# Patient Record
Sex: MALE | Race: WHITE
[De-identification: names, ages, dates, MRNs, and addresses within clinical notes are randomized per-mention and may not be internally consistent; named-entity substitution may affect disease eponyms.]

---

## 2018-06-18 LAB
AVERAGE GLUCOSE: NORMAL
CHOLESTEROL, TOTAL: 154 MG/DL
CHOLESTEROL/HDL RATIO: 3.3
HBA1C MFR BLD: 5.6 %
HDLC SERPL-MCNC: 47 MG/DL (ref 35–70)
LDL CHOLESTEROL CALCULATED: 94 MG/DL (ref 0–160)
TRIGL SERPL-MCNC: 50 MG/DL
VLDLC SERPL CALC-MCNC: NORMAL MG/DL

## 2018-06-27 NOTE — PATIENT DISCUSSION
Avoid strenuous activity after surgery. Protect eye at all times with shield or glasses for 2 weeks after surgery.

## 2018-06-27 NOTE — PATIENT DISCUSSION
Recommended Repair of Retinal Detachment surgery OS. PPV (25G), EL, AFGe 18% C3F8. Explained the process of the surgery to the patient today. Explained MAC and Block anesthetics to the patient as well as that the patient will take about 75 minutes and we will perform it tomorrow. We will perform the H&P today in clinic and obtain [facility] for the hypertension today. The patient states they are not on any blood thinners at this time and have not had any major heart/lung conditions that would need extensive MC. The patient does not need transportation. PO drops were placed into pharmacy at today's visit. We will follow up with the patient for a 1 day PO at 7:45 this Friday. We handed out a paper on pre-/post-operative information to the patient as well as brochures on Retinal Detachments, Vitrectomy Surgeries and [employer] chair if the patient feels they need it.

## 2018-06-27 NOTE — PATIENT DISCUSSION
Retinal detachment noted on today's examination and diagnostic testing. Recommended surgery to the patient.

## 2018-06-28 NOTE — PATIENT DISCUSSION
Recommended Repair of Retinal Detachment surgery OS. PPV (25G), EL, AFGe 18% C3F8. Explained the process of the surgery to the patient today. Explained MAC and Block anesthetics to the patient as well as that the patient will take about 75 minutes and we will perform it tomorrow. We will perform the H&P today in clinic and obtain [facility] for the hypertension today. The patient states they are not on any blood thinners at this time and have not had any major heart/lung conditions that would need extensive MC. The patient does not need transportation. PO drops were placed into pharmacy at today's visit. We will follow up with the patient for a 1 day PO at 7:45 this Friday. We handed out a paper on pre-/post-operative information to the patient as well as brochures on Retinal Detachments, Vitrectomy Surgeries and [employer] chair if the patient feels they need it.

## 2018-06-28 NOTE — PATIENT DISCUSSION
Explained post-operative information to the patient. The patient will have a red and sore eye for about a week. This is completely normal. If the pain becomes severe, we ask the patient to come back in immediately, 24/7 ER discussed. We provided post-operative drops with instructions and an eye patch for the patient to wear at night. The patient will begin Moxifloxacin four times a day and Pred Forte every two hours. The patient will need to be in head down positioning for 55 minutes out of every hour for the next week. The presence of an intraocular gas bubble means the patient will not be able to fly until the bubble has dissipated and the patient has been cleared. This will take about 2-2.5 months. We ask that the patient avoid excess water in the eye when showering and try to avoid swimming. This will help reduce possibility of infection. We ask that the patient wear the eye patch we give them at night while sleeping to avoid abrasions and excess rubbing of the eye while sleeping. We ask the patient to sleep on their side or stomach while in bed so the gas bubble is positioned correctly. We also ask that the patient the patient not participate in strenuous activity for the next few weeks and not lift more than 20 lbs for another week or two.

## 2018-06-29 NOTE — PATIENT DISCUSSION
Explained post-operative information to the patient. The patient will have a red and sore eye for about a week. This is completely normal. If the pain becomes severe, we ask the patient to come back in immediately, 24/7 ER discussed. We provided post-operative drops with instructions and an eye patch for the patient to wear at night. The patient will begin Moxifloxacin four times a day, Atropine once a night, Medrol DosePak as directed and Durezol four times a day. The patient will need to be in head down positioning for 55 minutes out of every hour for the next week. The presence of silicone oil has been explained to patient will require additional surgery in future, we need to give the eye time to heal and then we will discuss surgery at that time. We ask that the patient avoid excess water in the eye when showering and try to avoid swimming. This will help reduce possibility of infection. We ask that the patient wear the eye patch we give them at night while sleeping to avoid abrasions and excess rubbing of the eye while sleeping. We ask the patient to sleep on their side or stomach while in bed so the gas bubble is positioned correctly. We also ask that the patient not participate in strenuous activity for the next few weeks and not lift more than 20 lbs for another week or two.

## 2018-07-05 NOTE — PATIENT DISCUSSION
Explained post-operative information to the patient. The patient will have a red and sore eye for about a week. This is completely normal. If the pain becomes severe, we ask the patient to come back in immediately, 24/7 ER discussed. We provided post-operative drops with instructions and an eye patch for the patient to wear at night. The patient will begin D/C Moxi, Atropine once a night, Medrol DosePak as directed and Durezol four times a day. The patient will need to be in head down positioning for 55 minutes out of every hour for the next week. The presence of silicone oil has been explained to patient will require additional surgery in future, we need to give the eye time to heal and then we will discuss surgery at that time. We ask that the patient avoid excess water in the eye when showering and try to avoid swimming. This will help reduce possibility of infection. We ask that the patient wear the eye patch we give them at night while sleeping to avoid abrasions and excess rubbing of the eye while sleeping. We ask the patient to sleep on their side or stomach while in bed so the gas bubble is positioned correctly. We also ask that the patient not participate in strenuous activity for the next few weeks and not lift more than 20 lbs for another week or two.

## 2018-07-13 NOTE — PATIENT DISCUSSION
Explained post-operative information to the patient. If the pain becomes severe, we ask the patient to come back in immediately, 24/7 ER discussed. We provided post-operative drops with instructions. The patient to continue Cyclo bid and taper Durezol to tid . The presence of silicone oil has been explained to patient will require additional surgery in future, we need to give the eye time to heal and then we will discuss surgery at that time. We ask that the patient avoid excess water in the eye when showering and try to avoid swimming. This will help reduce possibility of infection. We ask that the patient wear the eye patch we give them at night while sleeping to avoid abrasions and excess rubbing of the eye while sleeping. We also ask that the patient not participate in strenuous activity for the next few weeks and not lift more than 20 lbs for another week or two.

## 2018-08-03 NOTE — PATIENT DISCUSSION
24/7 ER discussed. We provided post-operative drops with instructions. The patient to discontinue Cyclo and taper Durezol to bid until it runs out then we ask the patient to use Pred QID. The presence of silicone oil has been explained to patient will require additional surgery in future, we need to give the eye time to heal and then we will discuss surgery at that time.  We also ask that the patient not participate in strenuous activity for the next few weeks and not lift more than 20 lbs for another week or two.

## 2018-08-10 NOTE — PATIENT DISCUSSION
Recommended vitrectomy and scleral buckle for surgical repair.  New detachment noted today.  Inferotemporal PVR w/two breaks.  Recommend patient be seen by Dr. Juan Villeda for surgery.  Patient will follow up with our office for post op appointments.

## 2018-08-14 NOTE — PATIENT DISCUSSION
Explained post-operative information to the patient. The patient will have a red and sore eye for about a week. This is completely normal. If the pain becomes severe, we ask the patient to come back in immediately, 24/7 ER discussed. We provided post-operative drops with instructions and an eye patch for the patient to wear at night. The patient will begin Moxifloxacin four times a day and Pred Forte four times a day once Durezol runs out. The patient will need to be in head down positioning for 55 minutes out of every hour for the next week. The presence of an intraocular gas bubble means the patient will not be able to fly until the bubble has dissipated and the patient has been cleared. This will take about 2-2.5 months. We ask that the patient avoid excess water in the eye when showering and try to avoid swimming. This will help reduce possibility of infection. We ask that the patient wear the eye patch we give them at night while sleeping to avoid abrasions and excess rubbing of the eye while sleeping. We ask the patient to sleep on their side or stomach while in bed so the gas bubble is positioned correctly. We also ask that the patient the patient not participate in strenuous activity for the next few weeks and not lift more than 20 lbs for another week or two.

## 2018-08-17 NOTE — PATIENT DISCUSSION
Explained post-operative information to the patient. The patient will have a red and sore eye for about a week. This is completely normal. If the pain becomes severe, we ask the patient to come back in immediately, 24/7 ER discussed. We provided post-operative drops with instructions and an eye patch for the patient to wear at night. The patient will use  Moxifloxacin four times a day until [facility] then stop. Patient to use Pred Forte four times a day. The patient will need to be in head down positioning for 55 minutes out of every hour until Monday 8/20/18 then patient is to sleep on his side. The presence of an intraocular gas bubble means the patient will not be able to fly until the bubble has dissipated and the patient has been cleared. This will take about 2-2.5 months. We ask that the patient avoid excess water in the eye when showering and try to avoid swimming. This will help reduce possibility of infection. We ask that the patient wear the eye patch we give them at night while sleeping to avoid abrasions and excess rubbing of the eye while sleeping. We also ask that the patient the patient not participate in strenuous activity for the next few weeks and not lift more than 20 lbs for another week or two.

## 2019-01-09 NOTE — PATIENT DISCUSSION
Doing well, retina attached, Macula flat, no signs of endophthalmitis. Patient is ready for Oil removal, will have the patient see Dr. Oliva Phillips for the removal in about 4/5 weeks. Early PVR which can be removed with oil removal inferotemporal.

## 2019-02-01 NOTE — PATIENT DISCUSSION
Post Op day one FAX. Retina attached 360, macula is flat. Pred Forte qid and Ofloxacin qid. See pt back in 1 week.

## 2019-02-01 NOTE — PATIENT DISCUSSION
Doing well, retina attached, Macula flat, no signs of endophthalmitis. Patient is ready for Oil removal, will have the patient see Dr. Renetta Hester for the removal in about 4/5 weeks. Early PVR which can be removed with oil removal inferotemporal.

## 2019-02-08 NOTE — PATIENT DISCUSSION
Doing well, retina attached, Macula flat, no signs of endophthalmitis. Patient is ready for Oil removal, will have the patient see Dr. Cruz Petit for the removal in about 4/5 weeks. Early PVR which can be removed with oil removal inferotemporal.

## 2019-02-13 ENCOUNTER — HOSPITAL ENCOUNTER (EMERGENCY)
Dept: HOSPITAL 83 - ED | Age: 44
Discharge: HOME | End: 2019-02-13
Payer: COMMERCIAL

## 2019-02-13 VITALS — BODY MASS INDEX: 27.09 KG/M2 | HEIGHT: 71.97 IN | WEIGHT: 200 LBS

## 2019-02-13 DIAGNOSIS — R11.10: Primary | ICD-10-CM

## 2019-02-13 DIAGNOSIS — Z88.0: ICD-10-CM

## 2019-02-13 LAB
ALBUMIN SERPL-MCNC: 3.7 GM/DL (ref 3.1–4.5)
ALBUMIN SERPL-MCNC: NORMAL G/DL
ALP BLD-CCNC: NORMAL U/L
ALP SERPL-CCNC: 94 U/L (ref 45–117)
ALT SERPL W P-5'-P-CCNC: 26 U/L (ref 12–78)
ALT SERPL-CCNC: NORMAL U/L
ANION GAP SERPL CALCULATED.3IONS-SCNC: NORMAL MMOL/L
APTT PPP: 21.6 SECONDS (ref 20.8–31.5)
AST SERPL-CCNC: 16 IU/L (ref 3–35)
AST SERPL-CCNC: NORMAL U/L
BASOPHILS # BLD AUTO: 0.1 10*3/UL (ref 0–0.1)
BASOPHILS NFR BLD AUTO: 0.6 % (ref 0–1)
BILIRUB SERPL-MCNC: NORMAL MG/DL
BUN BLDV-MCNC: NORMAL MG/DL
BUN SERPL-MCNC: 9 MG/DL (ref 7–24)
CALCIUM SERPL-MCNC: NORMAL MG/DL
CHLORIDE BLD-SCNC: NORMAL MMOL/L
CHLORIDE SERPL-SCNC: 105 MMOL/L (ref 98–107)
CO2: NORMAL
CREAT SERPL-MCNC: 0.95 MG/DL (ref 0.7–1.3)
CREAT SERPL-MCNC: NORMAL MG/DL
EOSINOPHIL # BLD AUTO: 0.1 10*3/UL (ref 0–0.4)
EOSINOPHIL # BLD AUTO: 0.8 % (ref 1–4)
ERYTHROCYTE [DISTWIDTH] IN BLOOD BY AUTOMATED COUNT: 13.6 % (ref 0–14.5)
GFR CALCULATED: NORMAL
GLUCOSE BLD-MCNC: NORMAL MG/DL
HCT VFR BLD AUTO: 46.9 % (ref 42–52)
HGB BLD-MCNC: 15.6 G/DL (ref 14–18)
INR BLD: 1.1 (ref 2–3.5)
LYMPHOCYTES # BLD AUTO: 2.4 10*3/UL (ref 1.3–4.4)
LYMPHOCYTES NFR BLD AUTO: 16.6 % (ref 27–41)
MCH RBC QN AUTO: 29 PG (ref 27–31)
MCHC RBC AUTO-ENTMCNC: 33.3 G/DL (ref 33–37)
MCV RBC AUTO: 87.2 FL (ref 80–94)
MONOCYTES # BLD AUTO: 1 10*3/UL (ref 0.1–1)
MONOCYTES NFR BLD MANUAL: 6.8 % (ref 3–9)
NEUT #: 11 10*3/UL (ref 2.3–7.9)
NEUT %: 74.9 % (ref 47–73)
NRBC BLD QL AUTO: 0 % (ref 0–0)
PLATELET # BLD AUTO: 224 10*3/UL (ref 130–400)
PMV BLD AUTO: 8.3 FL (ref 9.6–12.3)
POTASSIUM SERPL-SCNC: 3.9 MMOL/L (ref 3.5–5.1)
POTASSIUM SERPL-SCNC: NORMAL MMOL/L
PROT SERPL-MCNC: 7.8 GM/DL (ref 6.4–8.2)
RBC # BLD AUTO: 5.38 10*6/UL (ref 4.5–5.9)
SODIUM BLD-SCNC: NORMAL MMOL/L
SODIUM SERPL-SCNC: 142 MMOL/L (ref 136–145)
TOTAL PROTEIN: NORMAL
TROPONIN I SERPL-MCNC: < 0.015 NG/ML (ref ?–0.04)
WBC NRBC COR # BLD AUTO: 14.7 10*3/UL (ref 4.8–10.8)

## 2019-03-08 NOTE — PATIENT DISCUSSION
Doing well, retina attached, Macula flat, no signs of endophthalmitis. Patient is ready for Oil removal, will have the patient see Dr. Pepe Ramos for the removal in about 4/5 weeks. Early PVR which can be removed with oil removal inferotemporal.

## 2019-03-08 NOTE — PATIENT DISCUSSION
S/P ppv week 6. Macula is flat, Attached 360. Will have the patient taper pred tid for 2 weeks, bid 2 weeks qd for 2 weeks then stop.

## 2019-03-11 NOTE — PATIENT DISCUSSION
Recurrent retinal detachment-SRF extending across macula, 9 clock hours detached, only attached nasally. Tolerating 100mg daily. AF well suppressed.

## 2019-03-11 NOTE — PATIENT DISCUSSION
Patient to return to Dr. Elizabeth Mix Thursday 3/14/19 for surgery.  Post-op with our office on Friday morning 3/15/19  @ 8:00.

## 2019-03-11 NOTE — PATIENT DISCUSSION
Doing well, retina attached, Macula flat, no signs of endophthalmitis. Patient is ready for Oil removal, will have the patient see Dr. Devon Leon for the removal in about 4/5 weeks. Early PVR which can be removed with oil removal inferotemporal.

## 2019-03-15 NOTE — PATIENT DISCUSSION
Post-op instructions given. Discussed drops-StartMaxitrol qid, positioning, no strenuous activity and eye protection-eye shield at night. Call immediately if eye pain or loss of vision.

## 2019-03-15 NOTE — PATIENT DISCUSSION
Recurrent retinal detachment-SRF extending across macula, 9 clock hours detached, only attached nasally.

## 2019-03-15 NOTE — PATIENT DISCUSSION
Patient to return to Dr. Burton Finneyr Thursday 3/14/19 for surgery.  Post-op with our office on Friday morning 3/15/19  @ 8:00.

## 2019-03-22 NOTE — PATIENT DISCUSSION
Doing well, retina attached, Macula flat, no signs of endophthalmitis. Patient is ready for Oil removal, will have the patient see Dr. Artur Guido for the removal in about 4/5 weeks. Early PVR which can be removed with oil removal inferotemporal.

## 2019-03-22 NOTE — PATIENT DISCUSSION
Patient to return to Dr. Jennifer Rollins Thursday 3/14/19 for surgery.  Post-op with our office on Friday morning 3/15/19  @ 8:00.

## 2019-03-22 NOTE — PATIENT DISCUSSION
Post-op week 1-Doing well, retina attached, good IOP with no signs of endophthalmitis. Post-op instructions given. Discussed drops, continue PRED QID. Call immediately if eye pain or loss of vision. Pt no longer needs to keep head down.

## 2019-05-03 NOTE — PATIENT DISCUSSION
Doing well, retina attached, Macula flat, no signs of endophthalmitis. Patient is ready for Oil removal, will have the patient see Dr. Leeann Hatchet for the removal in about 4/5 weeks. Early PVR which can be removed with oil removal inferotemporal.

## 2019-05-03 NOTE — PATIENT DISCUSSION
Post-op week 6-Doing well, retina attached, good IOP with no signs of endophthalmitis. Post-op instructions given. Discussed drops, continue PRED QID. Call immediately if eye pain or loss of vision. Pt no longer needs to keep head down.

## 2019-05-03 NOTE — PATIENT DISCUSSION
Patient to return to Dr. Kiana Caldera Thursday 3/14/19 for surgery.  Post-op with our office on Friday morning 3/15/19  @ 8:00.

## 2019-05-03 NOTE — PATIENT DISCUSSION
Call placed to Dr. Barrington Marinelli to inform that internal medicine order was ordered and that patient was seen by Dr. Berta Valera [facility]rology and patient was instructed to follow up outpatient. Instructed by Dr Natalie Greco to cancel ultrasound and continue medication consult about Flomax. GEL MASK.

## 2019-06-07 NOTE — PATIENT DISCUSSION
Post-op Month 3-Doing well, retina attached, good IOP with no signs of endophthalmitis. Pt to taper pred 3 gtts for 2 weeks, 2 gtts for 2 weeks, 1 gtts for 2 weeks then stop.  Pt to return in 2 months.

## 2019-07-19 ENCOUNTER — OFFICE VISIT (OUTPATIENT)
Dept: FAMILY MEDICINE CLINIC | Age: 44
End: 2019-07-19
Payer: COMMERCIAL

## 2019-07-19 VITALS
TEMPERATURE: 98.6 F | DIASTOLIC BLOOD PRESSURE: 92 MMHG | BODY MASS INDEX: 45.1 KG/M2 | HEIGHT: 70 IN | HEART RATE: 76 BPM | OXYGEN SATURATION: 98 % | WEIGHT: 315 LBS | SYSTOLIC BLOOD PRESSURE: 124 MMHG

## 2019-07-19 DIAGNOSIS — F70 MILD INTELLECTUAL DISABILITIES: ICD-10-CM

## 2019-07-19 DIAGNOSIS — F41.1 GENERALIZED ANXIETY DISORDER: ICD-10-CM

## 2019-07-19 DIAGNOSIS — E66.01 MORBID OBESITY WITH BMI OF 45.0-49.9, ADULT (HCC): ICD-10-CM

## 2019-07-19 DIAGNOSIS — R53.83 OTHER FATIGUE: ICD-10-CM

## 2019-07-19 DIAGNOSIS — H53.9 VISION DISTURBANCE: ICD-10-CM

## 2019-07-19 DIAGNOSIS — E66.01 CLASS 3 SEVERE OBESITY DUE TO EXCESS CALORIES WITHOUT SERIOUS COMORBIDITY WITH BODY MASS INDEX (BMI) OF 45.0 TO 49.9 IN ADULT (HCC): ICD-10-CM

## 2019-07-19 DIAGNOSIS — Z82.49 FAMILY HX OF ISCHEM HEART DIS AND OTH DIS OF THE CIRC SYS: Primary | ICD-10-CM

## 2019-07-19 DIAGNOSIS — R73.01 IMPAIRED FASTING GLUCOSE: ICD-10-CM

## 2019-07-19 PROCEDURE — 99213 OFFICE O/P EST LOW 20 MIN: CPT | Performed by: INTERNAL MEDICINE

## 2019-07-19 ASSESSMENT — ENCOUNTER SYMPTOMS
SORE THROAT: 0
ABDOMINAL PAIN: 0
BLOOD IN STOOL: 0
CONSTIPATION: 0
NAUSEA: 0
VOMITING: 0
CHEST TIGHTNESS: 0
SHORTNESS OF BREATH: 0
EYE PAIN: 0
COUGH: 0
RHINORRHEA: 0
DIARRHEA: 0

## 2019-07-19 ASSESSMENT — PATIENT HEALTH QUESTIONNAIRE - PHQ9
1. LITTLE INTEREST OR PLEASURE IN DOING THINGS: 0
SUM OF ALL RESPONSES TO PHQ9 QUESTIONS 1 & 2: 0
2. FEELING DOWN, DEPRESSED OR HOPELESS: 0
SUM OF ALL RESPONSES TO PHQ QUESTIONS 1-9: 0
SUM OF ALL RESPONSES TO PHQ QUESTIONS 1-9: 0

## 2019-07-19 NOTE — PROGRESS NOTES
[facility]) Physicians   Internal Medicine     2019  Kunal Level : 1975 Sex: male  Age:44 y.o. Chief Complaint   Patient presents with    Annual Exam        HPI:   Patient presents to office for review and management of chronic medical conditions.    - No other changes or concerns today. Last visit was 1 year ago. -  was seen in Er 2018 with right abdominal apin and right flank pain. No etiology and symptms resolved. -  right shoulder pain is stable.  has had physical therapy. States symptoms if lifts  heavy things and bothers him from time to time.    -  has allergies - some rhinorrhea. On Claritin D as needed. Health Maintenance   - immunizations:   Influenza Vaccination - ()  Pneumonia Vaccination  Zoster/Shingles Vaccine  Tetanus Vaccination - (2002)    - Screenings:   Colonoscopy   Prostate     Couseled on Home Safety     ROS:  Review of Systems   Constitutional: Negative for appetite change, chills, fever and unexpected weight change. HENT: Negative for congestion, rhinorrhea and sore throat. Eyes: Negative for pain and visual disturbance. Respiratory: Negative for cough, chest tightness and shortness of breath. Cardiovascular: Negative for chest pain and palpitations. Gastrointestinal: Negative for abdominal pain, blood in stool, constipation, diarrhea, nausea and vomiting. Genitourinary: Negative for difficulty urinating, dysuria, hematuria, scrotal swelling, testicular pain and urgency. Musculoskeletal: Negative for arthralgias and gait problem. Skin: Negative for rash. Neurological: Negative for dizziness, syncope, weakness, light-headedness and headaches. Hematological: Negative for adenopathy. Does not bruise/bleed easily. Psychiatric/Behavioral: Negative for suicidal ideas. The patient is not nervous/anxious.           Current Outpatient Medications:     Loratadine-Pseudoephedrine (CLARITIN-D 24 HOUR PO), Take well-developed and well-nourished. HENT:   Head: Normocephalic and atraumatic. Right Ear: External ear normal.   Left Ear: External ear normal.   Mouth/Throat: Oropharynx is clear and moist.   Eyes: Pupils are equal, round, and reactive to light. EOM are normal.   Neck: Normal range of motion. Neck supple. No thyromegaly present. Cardiovascular: Normal rate, regular rhythm and normal heart sounds. No murmur heard. Pulmonary/Chest: Effort normal and breath sounds normal. He has no wheezes. He has no rales. Abdominal: Soft. Bowel sounds are normal. There is no tenderness. There is no rebound and no guarding. Musculoskeletal: Normal range of motion. He exhibits no edema. Lymphadenopathy:     He has no cervical adenopathy. Neurological: He is alert and oriented to person, place, and time. No cranial nerve deficit. Skin: Skin is warm and dry. Psychiatric: He has a normal mood and affect. Judgment normal.       Assessment and Plan:    Lorna Parr was seen today for annual exam.    Diagnoses and all orders for this visit:    Mild intellectual disabilities  - continue present treatment    Generalized anxiety disorder  - continue present treatment  - no meds  - stable currently    Impaired fasting glucose  - continue present treatment  - watch diet - carb's and sugars  - last A1c was 5.6 (6/2018)    Vision disturbance  - left eye issues  - follows with optho    Class 3 severe obesity due to excess calories without serious comorbidity with body mass index (BMI) of 45.0 to 49.9 in [facility])  - discussed diet and exercise       Return in about 1 year (around 7/19/2020).       Orders Placed This Encounter   Procedures    Comprehensive Metabolic Panel     Standing Status:   Future     Standing Expiration Date:   7/19/2020    Magnesium     Standing Status:   Future     Standing Expiration Date:   7/19/2020    Lipid, Fasting     Standing Status:   Future     Standing Expiration Date:   7/19/2020    CBC Auto

## 2019-08-02 NOTE — PATIENT DISCUSSION
Doing well, retina attached, Macula flat, no signs of endophthalmitis. Patient is ready for Oil removal, will have the patient see Dr. Danitza Bautista for the removal in about 4/5 weeks. Early PVR which can be removed with oil removal inferotemporal.

## 2019-08-02 NOTE — PATIENT DISCUSSION
Patient to return to Dr. Mery Meek Thursday 3/14/19 for surgery.  Post-op with our office on Friday morning 3/15/19  @ 8:00.

## 2019-08-02 NOTE — PATIENT DISCUSSION
Post-op Month 4-Doing well, retina attached, good IOP with no signs of endophthalmitis. Pt to taper pred 3 gtts for 2 weeks, 2 gtts for 2 weeks, 1 gtts for 2 weeks then stop.  Pt to return in 4 months.

## 2019-11-21 SDOH — HEALTH STABILITY: MENTAL HEALTH: HOW OFTEN DO YOU HAVE A DRINK CONTAINING ALCOHOL?: NEVER

## 2020-10-18 NOTE — PATIENT DISCUSSION
No Retinal holes, Tears or Detachments. The patient is a 51y Male complaining of shortness of breath.

## 2020-10-19 NOTE — PATIENT DISCUSSION
Discussed condition and exacerbating conditions/situations (e.g., dry/arid environments, overhead fans, air conditioners, side effect of medications). Left message for patient to call back to schedule a BP check with the nurse.

## 2020-11-04 NOTE — PROCEDURE NOTE: CLINICAL
PROCEDURE NOTE: Avastin () OS. Diagnosis: Cystoid Macular Degeneration. Prep: Betadine Drops and Betadine Scrub. Prior to the original injection, risks/benefits/alternatives discussed including infection, loss of vision, hemorrhage, cataract, glaucoma, retinal tears or detachment. A written consent is on file, and the need for today’s injection was discussed and the patient is understanding and wishes to proceed. The off-label status of Intravitreal Avastin also was reviewed. The patient wished to proceed with treatment. The patient wished to proceed with treatment. Topical anesthetic drops were applied to the eye. Betadine prep was performed. Surgical mask worn. Sterile drape and lid speculum were applied. Using the syringe provided, Avastin 1.25 mg in 0.05 cc was injected into the vitreous cavity. Injection site: 3-4 mm from the limbus. Patient tolerated procedure well. Following the intravitreal injection, the sterile lid speculum was removed. CRA perfusion confirmed. CF vision checked. Patient given office phone number/answering service number and advised to call immediately should there be an increase in floaters or redness, loss of vision or pain, or should they have any other questions or concerns. Edwige Bhakta

## 2020-12-16 NOTE — PROCEDURE NOTE: CLINICAL
PROCEDURE NOTE: Avastin () OS. Diagnosis: Cystoid Macular Degeneration. Anesthesia: Proparacaine 0.5%. Prep: Betadine Drops and Betadine Scrub. Prior to the original injection, risks/benefits/alternatives discussed including infection, loss of vision, hemorrhage, cataract, glaucoma, retinal tears or detachment. A written consent is on file, and the need for today’s injection was discussed and the patient is understanding and wishes to proceed. The off-label status of Intravitreal Avastin also was reviewed. The patient wished to proceed with treatment. The patient wished to proceed with treatment. Topical anesthetic drops were applied to the eye. Betadine prep was performed. Surgical mask worn. Sterile drape and lid speculum were applied. Using the syringe provided, Avastin 1.25 mg in 0.05 cc was injected into the vitreous cavity. Injection site: 3-4 mm from the limbus. Patient tolerated procedure well. Following the intravitreal injection, the sterile lid speculum was removed. CRA perfusion confirmed. CF vision checked. Patient given office phone number/answering service number and advised to call immediately should there be an increase in floaters or redness, loss of vision or pain, or should they have any other questions or concerns. Deanna Lopez

## 2020-12-16 NOTE — PATIENT DISCUSSION
Patient brought back to preop from IR post procedure. OR staff here and waiting. Dr. Louie Sa aware of blood sugar trends and treatments that were given in IR per report from IR staff. Blood sugar on arrival to preop - 123. Patient son brought to bedside prior to patient going to OR. Retinal detachment warnings given.

## 2020-12-30 ENCOUNTER — NEW REFERRAL (OUTPATIENT)
Dept: URBAN - METROPOLITAN AREA CLINIC 33 | Facility: CLINIC | Age: 45
End: 2020-12-30

## 2020-12-30 VITALS — HEIGHT: 69 IN | BODY MASS INDEX: 37.77 KG/M2 | WEIGHT: 255 LBS

## 2020-12-30 DIAGNOSIS — H43.811: ICD-10-CM

## 2020-12-30 DIAGNOSIS — H33.011: ICD-10-CM

## 2020-12-30 DIAGNOSIS — H43.392: ICD-10-CM

## 2020-12-30 PROCEDURE — 92201 OPSCPY EXTND RTA DRAW UNI/BI: CPT

## 2020-12-30 PROCEDURE — 99204 OFFICE O/P NEW MOD 45 MIN: CPT

## 2020-12-30 PROCEDURE — 76512 OPH US DX B-SCAN: CPT

## 2020-12-30 ASSESSMENT — VISUAL ACUITY: OS_CC: CF 3FT

## 2020-12-30 ASSESSMENT — TONOMETRY
OD_IOP_MMHG: 27
OS_IOP_MMHG: 19

## 2021-01-11 ENCOUNTER — UNSCHEDULED FOLLOW UP (OUTPATIENT)
Dept: URBAN - METROPOLITAN AREA CLINIC 33 | Facility: CLINIC | Age: 46
End: 2021-01-11

## 2021-01-11 DIAGNOSIS — H33.011: ICD-10-CM

## 2021-01-11 DIAGNOSIS — H43.392: ICD-10-CM

## 2021-01-11 PROCEDURE — 92012 INTRM OPH EXAM EST PATIENT: CPT

## 2021-01-11 ASSESSMENT — TONOMETRY
OS_IOP_MMHG: 19
OD_IOP_MMHG: 39

## 2021-01-11 ASSESSMENT — VISUAL ACUITY
OD_CC: 20/200
OS_CC: CF 3FT

## 2021-01-19 ENCOUNTER — FOLLOW UP (OUTPATIENT)
Dept: URBAN - METROPOLITAN AREA CLINIC 33 | Facility: CLINIC | Age: 46
End: 2021-01-19

## 2021-01-19 DIAGNOSIS — H33.011: ICD-10-CM

## 2021-01-19 DIAGNOSIS — H43.392: ICD-10-CM

## 2021-01-19 PROCEDURE — 92012 INTRM OPH EXAM EST PATIENT: CPT

## 2021-01-19 RX ORDER — PREDNISOLONE ACETATE 10 MG/ML: 1 SUSPENSION/ DROPS OPHTHALMIC TWICE A DAY

## 2021-01-19 RX ORDER — BRIMONIDINE TARTRATE 2 MG/MG: 1 SOLUTION/ DROPS OPHTHALMIC

## 2021-01-19 RX ORDER — TIMOLOL MALEATE 6.8 MG/ML: 1 SOLUTION OPHTHALMIC TWICE A DAY

## 2021-01-19 ASSESSMENT — VISUAL ACUITY
OD_PH: 20/50
OD_CC: 20/200
OS_CC: CF 1FT

## 2021-01-19 ASSESSMENT — TONOMETRY
OD_IOP_MMHG: 32
OS_IOP_MMHG: 24

## 2021-01-27 NOTE — PROCEDURE NOTE: CLINICAL
PROCEDURE NOTE: Intravitreal Triesence OS. Diagnosis: Cystoid Macular Degeneration. Anesthesia: Pledget. Prep: Betadine Drops and Betadine Scrub. Prior to injection, risks/benefits/alternatives discussed including infection, loss of vision, hemorrhage, cataract, glaucoma, retinal tears or detachment. The off-label status of Intravitreal Triesence also was reviewed. The patient wished to proceed with treatment. Betadine prep was performed. Intravitreal injection of Triescence 4.0 mg/0.10 ml was given. Injection site: 3-4 mm from the limbus in the inferotemporal quadrant. Patient tolerated procedure well. There were no complications. Central retinal artery was perfused after injection. Post injection instructions given. Tracking # *. Lot #: [id number]. Expiration Date: 4738-20-24N99:00:00. Patient given office phone number/answering service number and advised to call immediately should there be an increase in floaters or redness, loss of vision or pain, or should they have any other questions or concerns. Inventory Id: [email or web address]

## 2021-03-03 ENCOUNTER — FOLLOW UP (OUTPATIENT)
Dept: URBAN - METROPOLITAN AREA CLINIC 33 | Facility: CLINIC | Age: 46
End: 2021-03-03

## 2021-03-03 DIAGNOSIS — H33.011: ICD-10-CM

## 2021-03-03 DIAGNOSIS — H43.392: ICD-10-CM

## 2021-03-03 DIAGNOSIS — H35.371: ICD-10-CM

## 2021-03-03 PROCEDURE — 92250 FUNDUS PHOTOGRAPHY W/I&R: CPT

## 2021-03-03 PROCEDURE — 92014 COMPRE OPH EXAM EST PT 1/>: CPT

## 2021-03-03 ASSESSMENT — VISUAL ACUITY
OD_CC: 20/200-1
OD_PH: 20/80-2
OS_CC: CF 1FT

## 2021-03-03 ASSESSMENT — TONOMETRY
OD_IOP_MMHG: 19
OS_IOP_MMHG: 19

## 2021-03-10 NOTE — PATIENT DISCUSSION
Minimal CME, improved no injection today.  Continue Pred TID for 2 weeks, 2 x 2 weeks ,1 x a day for 2 weeks then D/C.

## 2021-04-19 ENCOUNTER — TELEPHONE (OUTPATIENT)
Dept: ADMINISTRATIVE | Age: 46
End: 2021-04-19

## 2021-04-19 NOTE — TELEPHONE ENCOUNTER
Left messgae for patient to schedule annual visit with PCP for Ryan. Patient last seen Dr. Shabana Covarrubias in 2019.

## 2021-06-18 ENCOUNTER — FOLLOW UP (OUTPATIENT)
Dept: URBAN - METROPOLITAN AREA CLINIC 26 | Facility: CLINIC | Age: 46
End: 2021-06-18

## 2021-06-18 VITALS — HEIGHT: 69 IN | BODY MASS INDEX: 34.8 KG/M2 | WEIGHT: 235 LBS

## 2021-06-18 DIAGNOSIS — H33.011: ICD-10-CM

## 2021-06-18 DIAGNOSIS — H35.371: ICD-10-CM

## 2021-06-18 DIAGNOSIS — H43.392: ICD-10-CM

## 2021-06-18 DIAGNOSIS — H40.1110: ICD-10-CM

## 2021-06-18 PROCEDURE — 92014 COMPRE OPH EXAM EST PT 1/>: CPT

## 2021-06-18 PROCEDURE — 92250 FUNDUS PHOTOGRAPHY W/I&R: CPT

## 2021-06-18 ASSESSMENT — TONOMETRY
OD_IOP_MMHG: 20
OS_IOP_MMHG: 15

## 2021-06-18 ASSESSMENT — VISUAL ACUITY
OD_CC: 20/80-2
OS_CC: CF 3FT

## 2021-12-10 ENCOUNTER — PRE-OP/H&P (OUTPATIENT)
Dept: URBAN - METROPOLITAN AREA CLINIC 26 | Facility: CLINIC | Age: 46
End: 2021-12-10

## 2021-12-10 VITALS — WEIGHT: 257 LBS | HEIGHT: 69.5 IN | BODY MASS INDEX: 37.21 KG/M2

## 2021-12-10 DIAGNOSIS — H33.011: ICD-10-CM

## 2021-12-10 DIAGNOSIS — H35.371: ICD-10-CM

## 2021-12-10 DIAGNOSIS — H40.1110: ICD-10-CM

## 2021-12-10 DIAGNOSIS — H43.392: ICD-10-CM

## 2021-12-10 PROCEDURE — 92134 CPTRZ OPH DX IMG PST SGM RTA: CPT

## 2021-12-10 PROCEDURE — 92014 COMPRE OPH EXAM EST PT 1/>: CPT

## 2021-12-10 RX ORDER — PREDNISOLONE ACETATE 10 MG/ML: 1 SUSPENSION/ DROPS OPHTHALMIC

## 2021-12-10 RX ORDER — TOBRAMYCIN 3 MG/ML: 1 SOLUTION/ DROPS OPHTHALMIC

## 2021-12-10 ASSESSMENT — TONOMETRY
OD_IOP_MMHG: 28
OD_IOP_MMHG: 29
OS_IOP_MMHG: 17

## 2021-12-10 ASSESSMENT — VISUAL ACUITY
OS_CC: CF 2FT
OD_CC: 20/100-2

## 2021-12-23 ENCOUNTER — EMERGENCY VISIT (OUTPATIENT)
Dept: URBAN - METROPOLITAN AREA CLINIC 26 | Facility: CLINIC | Age: 46
End: 2021-12-23

## 2021-12-23 DIAGNOSIS — H43.392: ICD-10-CM

## 2021-12-23 DIAGNOSIS — H33.011: ICD-10-CM

## 2021-12-23 DIAGNOSIS — H40.1110: ICD-10-CM

## 2021-12-23 DIAGNOSIS — H35.371: ICD-10-CM

## 2021-12-23 PROCEDURE — 92014 COMPRE OPH EXAM EST PT 1/>: CPT

## 2021-12-23 ASSESSMENT — VISUAL ACUITY
OS_CC: CF 1FT
OD_PH: 20/400+1
OD_CC: 20/400-2

## 2021-12-23 ASSESSMENT — TONOMETRY
OS_IOP_MMHG: 18
OD_IOP_MMHG: 34

## 2022-01-06 ENCOUNTER — SURGERY/PROCEDURE (OUTPATIENT)
Dept: URBAN - METROPOLITAN AREA SURGERY 10 | Facility: SURGERY | Age: 47
End: 2022-01-06

## 2022-01-06 DIAGNOSIS — H33.011: ICD-10-CM

## 2022-01-06 PROCEDURE — 67039 LASER TREATMENT OF RETINA: CPT

## 2022-01-07 ENCOUNTER — POST-OP (OUTPATIENT)
Dept: URBAN - METROPOLITAN AREA CLINIC 26 | Facility: CLINIC | Age: 47
End: 2022-01-07

## 2022-01-07 DIAGNOSIS — H33.011: ICD-10-CM

## 2022-01-07 DIAGNOSIS — Z98.890: ICD-10-CM

## 2022-01-07 PROCEDURE — 99024 POSTOP FOLLOW-UP VISIT: CPT

## 2022-01-07 RX ORDER — DORZOLAMIDE 20 MG/ML: 1 SOLUTION/ DROPS OPHTHALMIC TWICE A DAY

## 2022-01-07 RX ORDER — PREDNISOLONE ACETATE 10 MG/ML: 1 SUSPENSION/ DROPS OPHTHALMIC

## 2022-01-07 RX ORDER — TOBRAMYCIN 3 MG/ML: 1 SOLUTION/ DROPS OPHTHALMIC

## 2022-01-07 ASSESSMENT — TONOMETRY
OS_IOP_MMHG: 17
OD_IOP_MMHG: 12

## 2022-01-07 ASSESSMENT — VISUAL ACUITY: OD_SC: 20/400+2

## 2022-01-27 NOTE — PATIENT DISCUSSION
Advised to call immediately if eye pain or loss of vision. Former smoker verbal instruction/written material/individual instruction

## 2022-02-04 ENCOUNTER — POST-OP (OUTPATIENT)
Dept: URBAN - METROPOLITAN AREA CLINIC 26 | Facility: CLINIC | Age: 47
End: 2022-02-04

## 2022-02-04 DIAGNOSIS — H35.371: ICD-10-CM

## 2022-02-04 DIAGNOSIS — H33.011: ICD-10-CM

## 2022-02-04 PROCEDURE — 92250 FUNDUS PHOTOGRAPHY W/I&R: CPT

## 2022-02-04 PROCEDURE — 99024 POSTOP FOLLOW-UP VISIT: CPT

## 2022-02-04 ASSESSMENT — TONOMETRY
OS_IOP_MMHG: 20
OD_IOP_MMHG: 26
OD_IOP_MMHG: 22

## 2022-02-04 ASSESSMENT — VISUAL ACUITY
OD_CC: 20/200+2
OS_CC: CF 2FT

## 2022-02-24 NOTE — PATIENT DISCUSSION
Retinal tear and detachment warning symptoms reviewed and patient instructed to call immediately if increasing floaters, flashes, or decreasing peripheral vision. Private car

## 2022-03-31 ENCOUNTER — POST-OP (OUTPATIENT)
Dept: URBAN - METROPOLITAN AREA CLINIC 26 | Facility: CLINIC | Age: 47
End: 2022-03-31

## 2022-03-31 DIAGNOSIS — H33.011: ICD-10-CM

## 2022-03-31 DIAGNOSIS — H35.371: ICD-10-CM

## 2022-03-31 DIAGNOSIS — H40.1110: ICD-10-CM

## 2022-03-31 DIAGNOSIS — H43.392: ICD-10-CM

## 2022-03-31 PROCEDURE — 92134 CPTRZ OPH DX IMG PST SGM RTA: CPT

## 2022-03-31 PROCEDURE — 92250 FUNDUS PHOTOGRAPHY W/I&R: CPT

## 2022-03-31 PROCEDURE — 99024 POSTOP FOLLOW-UP VISIT: CPT

## 2022-03-31 RX ORDER — TIMOLOL MALEATE 6.8 MG/ML: 1 SOLUTION OPHTHALMIC EVERY EVENING

## 2022-03-31 ASSESSMENT — TONOMETRY
OS_IOP_MMHG: 20
OD_IOP_MMHG: 28

## 2022-03-31 ASSESSMENT — VISUAL ACUITY
OD_CC: 20/100-2
OS_CC: CF 2FT
OD_SC: 20/200+1

## 2022-04-13 NOTE — PATIENT DISCUSSION
Discussed lid hygiene, warm compress and eyelid wash. Please see if there is anything else we can do to appeal. If not then let patient know and she could just do the otc lidocaine.

## 2022-04-13 NOTE — PATIENT DISCUSSION
Resolved, No CME, improved.  Taper off gtts, TID, BID, QDAY @ 2 wek intervals then stop. (0) independent

## 2022-06-03 ENCOUNTER — FOLLOW UP (OUTPATIENT)
Dept: URBAN - METROPOLITAN AREA CLINIC 26 | Facility: CLINIC | Age: 47
End: 2022-06-03

## 2022-06-03 VITALS — HEIGHT: 69 IN | BODY MASS INDEX: 38.06 KG/M2 | WEIGHT: 257 LBS

## 2022-06-03 DIAGNOSIS — H33.011: ICD-10-CM

## 2022-06-03 DIAGNOSIS — H43.392: ICD-10-CM

## 2022-06-03 DIAGNOSIS — H35.371: ICD-10-CM

## 2022-06-03 DIAGNOSIS — H40.1110: ICD-10-CM

## 2022-06-03 PROCEDURE — 92134 CPTRZ OPH DX IMG PST SGM RTA: CPT

## 2022-06-03 PROCEDURE — 92014 COMPRE OPH EXAM EST PT 1/>: CPT

## 2022-06-03 ASSESSMENT — TONOMETRY
OS_IOP_MMHG: 22
OD_IOP_MMHG: 23

## 2022-06-03 ASSESSMENT — VISUAL ACUITY
OS_CC: CF 3FT
OD_PH: 20/60
OD_CC: 20/70

## 2022-10-14 ENCOUNTER — COMPREHENSIVE EXAM (OUTPATIENT)
Dept: URBAN - METROPOLITAN AREA CLINIC 26 | Facility: CLINIC | Age: 47
End: 2022-10-14

## 2022-10-14 DIAGNOSIS — H43.392: ICD-10-CM

## 2022-10-14 DIAGNOSIS — H40.1110: ICD-10-CM

## 2022-10-14 DIAGNOSIS — H35.371: ICD-10-CM

## 2022-10-14 DIAGNOSIS — H33.011: ICD-10-CM

## 2022-10-14 PROCEDURE — 92250 FUNDUS PHOTOGRAPHY W/I&R: CPT

## 2022-10-14 PROCEDURE — 92134 CPTRZ OPH DX IMG PST SGM RTA: CPT

## 2022-10-14 PROCEDURE — 92014 COMPRE OPH EXAM EST PT 1/>: CPT

## 2022-10-14 ASSESSMENT — VISUAL ACUITY
OD_PH: 20/70
OD_CC: 20/80-1

## 2022-10-14 ASSESSMENT — TONOMETRY
OD_IOP_MMHG: 26
OD_IOP_MMHG: 29

## 2023-01-16 NOTE — PATIENT DISCUSSION
See Repair of RD for IMP/PLN. Initiate Treatment: Spironolactone 50 mg daily x2 weeks, then increase to 100 mg PO daily if tolerated\\nSkin Medicinals tretinoin-azelaic acid-niacinamide cream qHS Detail Level: Zone

## 2024-02-09 NOTE — PATIENT DISCUSSION
Problem: RESPIRATORY - ADULT  Goal: Achieves optimal ventilation and oxygenation  Description: INTERVENTIONS:  - Assess for changes in respiratory status  - Assess for changes in mentation and behavior  - Position to facilitate oxygenation and minimize respiratory effort  - Oxygen administered by appropriate delivery if ordered  - Initiate smoking cessation education as indicated  - Encourage broncho-pulmonary hygiene including cough, deep breathe, Incentive Spirometry  - Assess the need for suctioning and aspirate as needed  - Assess and instruct to report SOB or any respiratory difficulty  - Respiratory Therapy support as indicated  Outcome: Progressing     Problem: HEMATOLOGIC - ADULT  Goal: Maintains hematologic stability  Description: INTERVENTIONS  - Assess for signs and symptoms of bleeding or hemorrhage  - Monitor labs  - Administer supportive blood products/factors as ordered and appropriate  Outcome: Progressing      Explained post-operative information to the patient. The patient will have a red and sore eye for about a week. This is completely normal. If the pain becomes severe, we ask the patient to come back in immediately, 24/7 ER discussed. We provided post-operative drops with instructions and an eye patch for the patient to wear at night. The patient will use  Moxifloxacin four times a day until [facility] then stop. Patient to use Pred Forte four times a day. The patient will need to be in head down positioning for 55 minutes out of every hour until Monday 8/20/18 then patient is to sleep on his side. The presence of an intraocular gas bubble means the patient will not be able to fly until the bubble has dissipated and the patient has been cleared. This will take about 2-2.5 months. We ask that the patient avoid excess water in the eye when showering and try to avoid swimming. This will help reduce possibility of infection. We ask that the patient wear the eye patch we give them at night while sleeping to avoid abrasions and excess rubbing of the eye while sleeping. We also ask that the patient the patient not participate in strenuous activity for the next few weeks and not lift more than 20 lbs for another week or two.